# Patient Record
Sex: FEMALE | Race: WHITE | NOT HISPANIC OR LATINO | Employment: OTHER | URBAN - METROPOLITAN AREA
[De-identification: names, ages, dates, MRNs, and addresses within clinical notes are randomized per-mention and may not be internally consistent; named-entity substitution may affect disease eponyms.]

---

## 2021-09-03 ENCOUNTER — OFFICE VISIT (OUTPATIENT)
Dept: URGENT CARE | Facility: CLINIC | Age: 26
End: 2021-09-03
Payer: MEDICAID

## 2021-09-03 VITALS
TEMPERATURE: 98 F | OXYGEN SATURATION: 98 % | DIASTOLIC BLOOD PRESSURE: 76 MMHG | RESPIRATION RATE: 18 BRPM | SYSTOLIC BLOOD PRESSURE: 118 MMHG | WEIGHT: 140 LBS | HEART RATE: 65 BPM

## 2021-09-03 DIAGNOSIS — B34.9 VIRAL SYNDROME: ICD-10-CM

## 2021-09-03 DIAGNOSIS — R09.81 SINUS CONGESTION: Primary | ICD-10-CM

## 2021-09-03 LAB
CTP QC/QA: YES
SARS-COV-2 AG RESP QL IA.RAPID: NEGATIVE

## 2021-09-03 PROCEDURE — 87426 SARS CORONAVIRUS 2 ANTIGEN POCT: ICD-10-PCS | Mod: QW,S$GLB,, | Performed by: NURSE PRACTITIONER

## 2021-09-03 PROCEDURE — 96372 PR INJECTION,THERAP/PROPH/DIAG2ST, IM OR SUBCUT: ICD-10-PCS | Mod: S$GLB,,, | Performed by: NURSE PRACTITIONER

## 2021-09-03 PROCEDURE — 99213 OFFICE O/P EST LOW 20 MIN: CPT | Mod: 25,S$GLB,, | Performed by: NURSE PRACTITIONER

## 2021-09-03 PROCEDURE — 96372 THER/PROPH/DIAG INJ SC/IM: CPT | Mod: S$GLB,,, | Performed by: NURSE PRACTITIONER

## 2021-09-03 PROCEDURE — 87426 SARSCOV CORONAVIRUS AG IA: CPT | Mod: QW,S$GLB,, | Performed by: NURSE PRACTITIONER

## 2021-09-03 PROCEDURE — 99213 PR OFFICE/OUTPT VISIT, EST, LEVL III, 20-29 MIN: ICD-10-PCS | Mod: 25,S$GLB,, | Performed by: NURSE PRACTITIONER

## 2021-09-03 RX ORDER — GUAIFENESIN/DEXTROMETHORPHAN 100-10MG/5
5 SYRUP ORAL EVERY 6 HOURS PRN
Qty: 5 ML | Refills: 0 | Status: SHIPPED | OUTPATIENT
Start: 2021-09-03 | End: 2021-09-13

## 2021-09-03 RX ORDER — AZITHROMYCIN 250 MG/1
TABLET, FILM COATED ORAL
Qty: 6 TABLET | Refills: 0 | Status: ON HOLD | OUTPATIENT
Start: 2021-09-03 | End: 2022-03-27 | Stop reason: HOSPADM

## 2021-09-03 RX ORDER — DEXAMETHASONE SODIUM PHOSPHATE 4 MG/ML
4 INJECTION, SOLUTION INTRA-ARTICULAR; INTRALESIONAL; INTRAMUSCULAR; INTRAVENOUS; SOFT TISSUE
Status: COMPLETED | OUTPATIENT
Start: 2021-09-03 | End: 2021-09-03

## 2021-09-03 RX ORDER — PREDNISONE 20 MG/1
20 TABLET ORAL DAILY
Qty: 5 TABLET | Refills: 0 | Status: SHIPPED | OUTPATIENT
Start: 2021-09-03 | End: 2021-09-08

## 2021-09-03 RX ADMIN — DEXAMETHASONE SODIUM PHOSPHATE 4 MG: 4 INJECTION, SOLUTION INTRA-ARTICULAR; INTRALESIONAL; INTRAMUSCULAR; INTRAVENOUS; SOFT TISSUE at 09:09

## 2022-03-25 PROCEDURE — 99285 EMERGENCY DEPT VISIT HI MDM: CPT | Mod: 25

## 2022-03-25 PROCEDURE — 96374 THER/PROPH/DIAG INJ IV PUSH: CPT

## 2022-03-26 ENCOUNTER — HOSPITAL ENCOUNTER (OUTPATIENT)
Facility: HOSPITAL | Age: 27
Discharge: HOME | End: 2022-03-27
Attending: EMERGENCY MEDICINE | Admitting: STUDENT IN AN ORGANIZED HEALTH CARE EDUCATION/TRAINING PROGRAM
Payer: MEDICAID

## 2022-03-26 ENCOUNTER — HOSPITAL ENCOUNTER (EMERGENCY)
Facility: HOSPITAL | Age: 27
Discharge: ANOTHER HOSPITAL | End: 2022-03-26
Attending: EMERGENCY MEDICINE
Payer: MEDICAID

## 2022-03-26 VITALS
HEIGHT: 68 IN | HEART RATE: 95 BPM | SYSTOLIC BLOOD PRESSURE: 125 MMHG | RESPIRATION RATE: 16 BRPM | WEIGHT: 130 LBS | BODY MASS INDEX: 19.7 KG/M2 | TEMPERATURE: 98 F | OXYGEN SATURATION: 97 % | DIASTOLIC BLOOD PRESSURE: 74 MMHG

## 2022-03-26 DIAGNOSIS — K66.1 HEMOPERITONEUM, NONTRAUMATIC: ICD-10-CM

## 2022-03-26 DIAGNOSIS — K66.1 HEMOPERITONEUM: ICD-10-CM

## 2022-03-26 DIAGNOSIS — N83.209 RUPTURED OVARIAN CYST: Primary | ICD-10-CM

## 2022-03-26 LAB
ABO + RH BLD: NORMAL
ALBUMIN SERPL BCP-MCNC: 4.1 G/DL (ref 3.5–5.2)
ALBUMIN SERPL BCP-MCNC: 4.1 G/DL (ref 3.5–5.2)
ALP SERPL-CCNC: 37 U/L (ref 55–135)
ALP SERPL-CCNC: 45 U/L (ref 55–135)
ALT SERPL W/O P-5'-P-CCNC: 11 U/L (ref 10–44)
ALT SERPL W/O P-5'-P-CCNC: 14 U/L (ref 10–44)
ANION GAP SERPL CALC-SCNC: 10 MMOL/L (ref 8–16)
ANION GAP SERPL CALC-SCNC: 6 MMOL/L (ref 8–16)
AST SERPL-CCNC: 14 U/L (ref 10–40)
AST SERPL-CCNC: 15 U/L (ref 10–40)
B-HCG UR QL: NEGATIVE
BACTERIA GENITAL QL WET PREP: ABNORMAL
BASOPHILS # BLD AUTO: 0.01 K/UL (ref 0–0.2)
BASOPHILS # BLD AUTO: 0.01 K/UL (ref 0–0.2)
BASOPHILS # BLD AUTO: 0.02 K/UL (ref 0–0.2)
BASOPHILS NFR BLD: 0.1 % (ref 0–1.9)
BASOPHILS NFR BLD: 0.2 % (ref 0–1.9)
BASOPHILS NFR BLD: 0.3 % (ref 0–1.9)
BILIRUB SERPL-MCNC: 0.5 MG/DL (ref 0.1–1)
BILIRUB SERPL-MCNC: 0.8 MG/DL (ref 0.1–1)
BILIRUB UR QL STRIP: NEGATIVE
BLD GP AB SCN CELLS X3 SERPL QL: NORMAL
BUN SERPL-MCNC: 13 MG/DL (ref 6–20)
BUN SERPL-MCNC: 15 MG/DL (ref 6–20)
CALCIUM SERPL-MCNC: 8.9 MG/DL (ref 8.7–10.5)
CALCIUM SERPL-MCNC: 9.3 MG/DL (ref 8.7–10.5)
CHLORIDE SERPL-SCNC: 105 MMOL/L (ref 95–110)
CHLORIDE SERPL-SCNC: 106 MMOL/L (ref 95–110)
CLARITY UR: CLEAR
CLUE CELLS VAG QL WET PREP: ABNORMAL
CO2 SERPL-SCNC: 24 MMOL/L (ref 23–29)
CO2 SERPL-SCNC: 25 MMOL/L (ref 23–29)
COLOR UR: YELLOW
CREAT SERPL-MCNC: 0.5 MG/DL (ref 0.5–1.4)
CREAT SERPL-MCNC: 0.8 MG/DL (ref 0.5–1.4)
DIFFERENTIAL METHOD: ABNORMAL
EOSINOPHIL # BLD AUTO: 0.1 K/UL (ref 0–0.5)
EOSINOPHIL NFR BLD: 1.5 % (ref 0–8)
EOSINOPHIL NFR BLD: 1.6 % (ref 0–8)
EOSINOPHIL NFR BLD: 1.7 % (ref 0–8)
ERYTHROCYTE [DISTWIDTH] IN BLOOD BY AUTOMATED COUNT: 12.6 % (ref 11.5–14.5)
ERYTHROCYTE [DISTWIDTH] IN BLOOD BY AUTOMATED COUNT: 12.7 % (ref 11.5–14.5)
ERYTHROCYTE [DISTWIDTH] IN BLOOD BY AUTOMATED COUNT: 12.8 % (ref 11.5–14.5)
EST. GFR  (AFRICAN AMERICAN): >60 ML/MIN/1.73 M^2
EST. GFR  (AFRICAN AMERICAN): >60 ML/MIN/1.73 M^2
EST. GFR  (NON AFRICAN AMERICAN): >60 ML/MIN/1.73 M^2
EST. GFR  (NON AFRICAN AMERICAN): >60 ML/MIN/1.73 M^2
FILAMENT FUNGI VAG WET PREP-#/AREA: ABNORMAL
GLUCOSE SERPL-MCNC: 91 MG/DL (ref 70–110)
GLUCOSE SERPL-MCNC: 96 MG/DL (ref 70–110)
GLUCOSE UR QL STRIP: NEGATIVE
HCG INTACT+B SERPL-ACNC: <1.2 MIU/ML
HCT VFR BLD AUTO: 31.8 % (ref 37–48.5)
HCT VFR BLD AUTO: 32.7 % (ref 37–48.5)
HCT VFR BLD AUTO: 33.1 % (ref 37–48.5)
HCT VFR BLD AUTO: 33.7 % (ref 37–48.5)
HCT VFR BLD AUTO: 34.3 % (ref 37–48.5)
HGB BLD-MCNC: 10.6 G/DL (ref 12–16)
HGB BLD-MCNC: 11 G/DL (ref 12–16)
HGB BLD-MCNC: 11.1 G/DL (ref 12–16)
HGB BLD-MCNC: 11.2 G/DL (ref 12–16)
HGB BLD-MCNC: 11.4 G/DL (ref 12–16)
HGB UR QL STRIP: NEGATIVE
IMM GRANULOCYTES # BLD AUTO: 0.01 K/UL (ref 0–0.04)
IMM GRANULOCYTES # BLD AUTO: 0.01 K/UL (ref 0–0.04)
IMM GRANULOCYTES # BLD AUTO: 0.02 K/UL (ref 0–0.04)
IMM GRANULOCYTES NFR BLD AUTO: 0.2 % (ref 0–0.5)
IMM GRANULOCYTES NFR BLD AUTO: 0.2 % (ref 0–0.5)
IMM GRANULOCYTES NFR BLD AUTO: 0.3 % (ref 0–0.5)
KETONES UR QL STRIP: ABNORMAL
LEUKOCYTE ESTERASE UR QL STRIP: NEGATIVE
LYMPHOCYTES # BLD AUTO: 2.2 K/UL (ref 1–4.8)
LYMPHOCYTES # BLD AUTO: 2.3 K/UL (ref 1–4.8)
LYMPHOCYTES # BLD AUTO: 2.7 K/UL (ref 1–4.8)
LYMPHOCYTES NFR BLD: 36.6 % (ref 18–48)
LYMPHOCYTES NFR BLD: 38.3 % (ref 18–48)
LYMPHOCYTES NFR BLD: 39.1 % (ref 18–48)
MCH RBC QN AUTO: 30.8 PG (ref 27–31)
MCH RBC QN AUTO: 31.3 PG (ref 27–31)
MCH RBC QN AUTO: 31.3 PG (ref 27–31)
MCH RBC QN AUTO: 31.4 PG (ref 27–31)
MCH RBC QN AUTO: 31.6 PG (ref 27–31)
MCHC RBC AUTO-ENTMCNC: 32.9 G/DL (ref 32–36)
MCHC RBC AUTO-ENTMCNC: 33.2 G/DL (ref 32–36)
MCHC RBC AUTO-ENTMCNC: 33.2 G/DL (ref 32–36)
MCHC RBC AUTO-ENTMCNC: 33.3 G/DL (ref 32–36)
MCHC RBC AUTO-ENTMCNC: 34.3 G/DL (ref 32–36)
MCV RBC AUTO: 91 FL (ref 82–98)
MCV RBC AUTO: 93 FL (ref 82–98)
MCV RBC AUTO: 94 FL (ref 82–98)
MCV RBC AUTO: 95 FL (ref 82–98)
MCV RBC AUTO: 95 FL (ref 82–98)
MONOCYTES # BLD AUTO: 0.6 K/UL (ref 0.3–1)
MONOCYTES # BLD AUTO: 0.7 K/UL (ref 0.3–1)
MONOCYTES # BLD AUTO: 0.7 K/UL (ref 0.3–1)
MONOCYTES NFR BLD: 10.3 % (ref 4–15)
MONOCYTES NFR BLD: 10.7 % (ref 4–15)
MONOCYTES NFR BLD: 9.9 % (ref 4–15)
NEUTROPHILS # BLD AUTO: 3 K/UL (ref 1.8–7.7)
NEUTROPHILS # BLD AUTO: 3.1 K/UL (ref 1.8–7.7)
NEUTROPHILS # BLD AUTO: 3.4 K/UL (ref 1.8–7.7)
NEUTROPHILS NFR BLD: 48.5 % (ref 38–73)
NEUTROPHILS NFR BLD: 49 % (ref 38–73)
NEUTROPHILS NFR BLD: 51.5 % (ref 38–73)
NITRITE UR QL STRIP: NEGATIVE
NRBC BLD-RTO: 0 /100 WBC
PH UR STRIP: 6 [PH] (ref 5–8)
PLATELET # BLD AUTO: 150 K/UL (ref 150–450)
PLATELET # BLD AUTO: 154 K/UL (ref 150–450)
PLATELET # BLD AUTO: 168 K/UL (ref 150–450)
PLATELET # BLD AUTO: 178 K/UL (ref 150–450)
PLATELET # BLD AUTO: 182 K/UL (ref 150–450)
PMV BLD AUTO: 9.2 FL (ref 9.2–12.9)
PMV BLD AUTO: 9.4 FL (ref 9.2–12.9)
PMV BLD AUTO: 9.5 FL (ref 9.2–12.9)
PMV BLD AUTO: 9.5 FL (ref 9.2–12.9)
PMV BLD AUTO: 9.6 FL (ref 9.2–12.9)
POTASSIUM SERPL-SCNC: 3.4 MMOL/L (ref 3.5–5.1)
POTASSIUM SERPL-SCNC: 3.4 MMOL/L (ref 3.5–5.1)
PROT SERPL-MCNC: 6.5 G/DL (ref 6–8.4)
PROT SERPL-MCNC: 6.5 G/DL (ref 6–8.4)
PROT UR QL STRIP: NEGATIVE
RBC # BLD AUTO: 3.38 M/UL (ref 4–5.4)
RBC # BLD AUTO: 3.55 M/UL (ref 4–5.4)
RBC # BLD AUTO: 3.57 M/UL (ref 4–5.4)
RBC # BLD AUTO: 3.58 M/UL (ref 4–5.4)
RBC # BLD AUTO: 3.61 M/UL (ref 4–5.4)
SARS-COV-2 RDRP RESP QL NAA+PROBE: NEGATIVE
SODIUM SERPL-SCNC: 136 MMOL/L (ref 136–145)
SODIUM SERPL-SCNC: 140 MMOL/L (ref 136–145)
SP GR UR STRIP: >=1.03 (ref 1–1.03)
SPECIMEN SOURCE: ABNORMAL
T VAGINALIS GENITAL QL WET PREP: ABNORMAL
URN SPEC COLLECT METH UR: ABNORMAL
UROBILINOGEN UR STRIP-ACNC: NEGATIVE EU/DL
WBC # BLD AUTO: 5.23 K/UL (ref 3.9–12.7)
WBC # BLD AUTO: 6.06 K/UL (ref 3.9–12.7)
WBC # BLD AUTO: 6.09 K/UL (ref 3.9–12.7)
WBC # BLD AUTO: 6.6 K/UL (ref 3.9–12.7)
WBC # BLD AUTO: 6.98 K/UL (ref 3.9–12.7)
WBC #/AREA VAG WET PREP: ABNORMAL
YEAST GENITAL QL WET PREP: ABNORMAL

## 2022-03-26 PROCEDURE — 87210 SMEAR WET MOUNT SALINE/INK: CPT | Performed by: EMERGENCY MEDICINE

## 2022-03-26 PROCEDURE — G0378 HOSPITAL OBSERVATION PER HR: HCPCS

## 2022-03-26 PROCEDURE — 81003 URINALYSIS AUTO W/O SCOPE: CPT | Performed by: EMERGENCY MEDICINE

## 2022-03-26 PROCEDURE — 36415 COLL VENOUS BLD VENIPUNCTURE: CPT | Performed by: EMERGENCY MEDICINE

## 2022-03-26 PROCEDURE — 99285 EMERGENCY DEPT VISIT HI MDM: CPT | Mod: 25

## 2022-03-26 PROCEDURE — 25000003 PHARM REV CODE 250: Performed by: EMERGENCY MEDICINE

## 2022-03-26 PROCEDURE — 81025 URINE PREGNANCY TEST: CPT | Performed by: EMERGENCY MEDICINE

## 2022-03-26 PROCEDURE — 96366 THER/PROPH/DIAG IV INF ADDON: CPT

## 2022-03-26 PROCEDURE — 96375 TX/PRO/DX INJ NEW DRUG ADDON: CPT

## 2022-03-26 PROCEDURE — 87491 CHLMYD TRACH DNA AMP PROBE: CPT | Performed by: EMERGENCY MEDICINE

## 2022-03-26 PROCEDURE — 63600175 PHARM REV CODE 636 W HCPCS: Performed by: EMERGENCY MEDICINE

## 2022-03-26 PROCEDURE — 80053 COMPREHEN METABOLIC PANEL: CPT | Performed by: EMERGENCY MEDICINE

## 2022-03-26 PROCEDURE — 80053 COMPREHEN METABOLIC PANEL: CPT | Mod: 91 | Performed by: EMERGENCY MEDICINE

## 2022-03-26 PROCEDURE — 85025 COMPLETE CBC W/AUTO DIFF WBC: CPT | Mod: 91 | Performed by: EMERGENCY MEDICINE

## 2022-03-26 PROCEDURE — 25500020 PHARM REV CODE 255: Performed by: EMERGENCY MEDICINE

## 2022-03-26 PROCEDURE — U0002 COVID-19 LAB TEST NON-CDC: HCPCS | Performed by: EMERGENCY MEDICINE

## 2022-03-26 PROCEDURE — 85027 COMPLETE CBC AUTOMATED: CPT | Performed by: STUDENT IN AN ORGANIZED HEALTH CARE EDUCATION/TRAINING PROGRAM

## 2022-03-26 PROCEDURE — 86901 BLOOD TYPING SEROLOGIC RH(D): CPT | Performed by: EMERGENCY MEDICINE

## 2022-03-26 PROCEDURE — 85025 COMPLETE CBC W/AUTO DIFF WBC: CPT | Performed by: EMERGENCY MEDICINE

## 2022-03-26 PROCEDURE — 36415 COLL VENOUS BLD VENIPUNCTURE: CPT | Performed by: STUDENT IN AN ORGANIZED HEALTH CARE EDUCATION/TRAINING PROGRAM

## 2022-03-26 PROCEDURE — 25000003 PHARM REV CODE 250: Performed by: STUDENT IN AN ORGANIZED HEALTH CARE EDUCATION/TRAINING PROGRAM

## 2022-03-26 PROCEDURE — 84702 CHORIONIC GONADOTROPIN TEST: CPT | Performed by: EMERGENCY MEDICINE

## 2022-03-26 PROCEDURE — 96365 THER/PROPH/DIAG IV INF INIT: CPT

## 2022-03-26 PROCEDURE — 87591 N.GONORRHOEAE DNA AMP PROB: CPT | Performed by: EMERGENCY MEDICINE

## 2022-03-26 RX ORDER — MORPHINE SULFATE 2 MG/ML
6 INJECTION, SOLUTION INTRAMUSCULAR; INTRAVENOUS
Status: COMPLETED | OUTPATIENT
Start: 2022-03-26 | End: 2022-03-26

## 2022-03-26 RX ORDER — DEXTROSE MONOHYDRATE, SODIUM CHLORIDE, AND POTASSIUM CHLORIDE 50; 1.49; 9 G/1000ML; G/1000ML; G/1000ML
INJECTION, SOLUTION INTRAVENOUS CONTINUOUS
Status: DISCONTINUED | OUTPATIENT
Start: 2022-03-26 | End: 2022-03-26

## 2022-03-26 RX ORDER — OXYCODONE HYDROCHLORIDE 5 MG/1
5 TABLET ORAL EVERY 4 HOURS PRN
Status: DISCONTINUED | OUTPATIENT
Start: 2022-03-26 | End: 2022-03-27 | Stop reason: HOSPADM

## 2022-03-26 RX ORDER — MORPHINE SULFATE 4 MG/ML
4 INJECTION, SOLUTION INTRAMUSCULAR; INTRAVENOUS EVERY 4 HOURS PRN
Status: CANCELLED | OUTPATIENT
Start: 2022-03-26

## 2022-03-26 RX ORDER — OXYCODONE HYDROCHLORIDE 5 MG/1
10 TABLET ORAL EVERY 4 HOURS PRN
Status: DISCONTINUED | OUTPATIENT
Start: 2022-03-26 | End: 2022-03-27 | Stop reason: HOSPADM

## 2022-03-26 RX ORDER — HYDROMORPHONE HYDROCHLORIDE 1 MG/ML
1 INJECTION, SOLUTION INTRAMUSCULAR; INTRAVENOUS; SUBCUTANEOUS
Status: COMPLETED | OUTPATIENT
Start: 2022-03-26 | End: 2022-03-26

## 2022-03-26 RX ORDER — ACETAMINOPHEN 500 MG
1000 TABLET ORAL EVERY 8 HOURS PRN
Status: DISCONTINUED | OUTPATIENT
Start: 2022-03-26 | End: 2022-03-27 | Stop reason: HOSPADM

## 2022-03-26 RX ORDER — ONDANSETRON 2 MG/ML
4 INJECTION INTRAMUSCULAR; INTRAVENOUS
Status: COMPLETED | OUTPATIENT
Start: 2022-03-26 | End: 2022-03-26

## 2022-03-26 RX ORDER — ONDANSETRON 2 MG/ML
4 INJECTION INTRAMUSCULAR; INTRAVENOUS EVERY 6 HOURS PRN
Status: CANCELLED | OUTPATIENT
Start: 2022-03-26

## 2022-03-26 RX ORDER — SODIUM CHLORIDE 0.9 % (FLUSH) 0.9 %
3 SYRINGE (ML) INJECTION EVERY 8 HOURS
Status: CANCELLED | OUTPATIENT
Start: 2022-03-26

## 2022-03-26 RX ORDER — POTASSIUM CHLORIDE 7.45 MG/ML
10 INJECTION INTRAVENOUS ONCE
Status: COMPLETED | OUTPATIENT
Start: 2022-03-26 | End: 2022-03-26

## 2022-03-26 RX ADMIN — HYDROMORPHONE HYDROCHLORIDE 1 MG: 1 INJECTION, SOLUTION INTRAMUSCULAR; INTRAVENOUS; SUBCUTANEOUS at 11:03

## 2022-03-26 RX ADMIN — MORPHINE SULFATE 6 MG: 2 INJECTION, SOLUTION INTRAMUSCULAR; INTRAVENOUS at 07:03

## 2022-03-26 RX ADMIN — OXYCODONE HYDROCHLORIDE 5 MG: 5 TABLET ORAL at 06:03

## 2022-03-26 RX ADMIN — IOHEXOL 75 ML: 350 INJECTION, SOLUTION INTRAVENOUS at 04:03

## 2022-03-26 RX ADMIN — ONDANSETRON 4 MG: 2 INJECTION INTRAMUSCULAR; INTRAVENOUS at 11:03

## 2022-03-26 RX ADMIN — POTASSIUM CHLORIDE 130 ML/HR: 149 INJECTION, SOLUTION, CONCENTRATE INTRAVENOUS at 01:03

## 2022-03-26 RX ADMIN — POTASSIUM CHLORIDE 10 MEQ: 7.46 INJECTION, SOLUTION INTRAVENOUS at 01:03

## 2022-03-26 RX ADMIN — IBUPROFEN 600 MG: 200 TABLET, FILM COATED ORAL at 06:03

## 2022-03-26 RX ADMIN — OXYCODONE HYDROCHLORIDE 5 MG: 5 TABLET ORAL at 10:03

## 2022-03-26 RX ADMIN — OXYCODONE HYDROCHLORIDE 5 MG: 5 TABLET ORAL at 11:03

## 2022-03-26 NOTE — H&P
[facility] - Emergency Dept  Obstetrics & Gynecology  History & Physical    Patient Name: Nola Reyna  MRN: [id number]  Admission Date: 3/26/2022  Primary Care Provider: Primary Doctor No    Subjective:     Chief Complaint/Reason for Admission:  Abdominal pain and hemoperitoneum, possible ruptured ovarian cyst    History of Present Illness:  27-year-old  presented to [facility] ED with abdominal pain after vaginal intercourse that has lasted last 2 days.  She was initially tachycardic on presentation which is now resolved. She denied any vaginal bleeding, discharge, nausea, vomiting, syncope, lightheadedness, dizziness, or any other symptoms.  Her pregnancy test was negative, however on imaging she was noted to have a complex ovarian cyst with no evidence of torsion but possibly ruptured with apparent hemoperitoneum seen on imaging.  Her H/H was stable prior to transfer for observation here at [facility].  She has not required any IV pain medications since 11:00 a.m..  Declined pain medication the last time it was offered.  Still without any of the above symptoms besides abdominal pain. She does report however that she feels much better than when she initially presented to ED.     Current Facility-Administered Medications on File Prior to Encounter   Medication    [COMPLETED] iohexoL (OMNIPAQUE 350) injection 75 mL    [COMPLETED] morphine injection 6 mg     Current Outpatient Medications on File Prior to Encounter   Medication Sig    azithromycin (Z-NAMITA) 250 MG tablet Take 2 tablets by mouth on day 1; Take 1 tablet by mouth on days 2-5       Review of patient's allergies indicates:  No Known Allergies    No past medical history on file.  OB History   No obstetric history on file.     No past surgical history on file.  Family History    None       Tobacco Use    Smoking status: Never Smoker    Smokeless tobacco: Never Used   Substance and Sexual Activity    Alcohol use: Not on file     Drug use: Not on file    Sexual activity: Not on file     Review of Systems negative except above  Objective:     Vital Signs (Most Recent):  Temp: 98.5 °F (36.9 °C) (03/26/22 1025)  Pulse: (!) 44 (03/26/22 1557)  Resp: (!) 24 (03/26/22 1107)  BP: 114/70 (03/26/22 1431)  SpO2: 95 % (03/26/22 1557) Vital Signs (24h Range):  Temp:  [98.1 °F (36.7 °C)-98.5 °F (36.9 °C)] 98.5 °F (36.9 °C)  Pulse:  [] 44  Resp:  [16-24] 24  SpO2:  [94 %-100 %] 95 %  BP: (100-128)/(55-74) 114/70     Weight: 59 kg (130 lb)  Body mass index is 19.77 kg/m².  Patient's last menstrual period was 03/05/2022 (approximate).    Physical Exam  NAD, AAO  Regular rhythm, bradycardic (pt reports this is her baseline)  Nonlabored respirations  Abdomen soft, nondistended, diffusely tender, no guarding, positive rebound consistent with hemoperitoneum  :  Deferred  Extremities:  No edema, no calf tenderness, no evidence of DVT      Laboratory:  Recent Lab Results  (Last 5 results in the past 24 hours)      03/26/22  1540   03/26/22  1103   03/26/22  0737   03/26/22  0736   03/26/22  0724        Albumin   4.1             Alkaline Phosphatase   37             ALT   14             Anion Gap   6             AST   15             Baso #   0.02   0.01           Basophil %   0.3   0.2           BILIRUBIN TOTAL   0.8  Comment: For infants and newborns, interpretation of results should be based  on gestational age, weight and in agreement with clinical  observations.    Premature Infant recommended reference ranges:  Up to 24 hours.............<8.0 mg/dL  Up to 48 hours............<12.0 mg/dL  3-5 days..................<15.0 mg/dL  6-29 days.................<15.0 mg/dL               BUN   13             Calcium   8.9             Chloride   105             CO2   25             Creatinine   0.5             Differential Method   Automated   Automated           eGFR if    >60.0             eGFR if non    >60.0  Comment:  Calculation used to obtain the estimated glomerular filtration  rate (eGFR) is the CKD-EPI equation.                Eos #   0.1   0.1           Eosinophil %   1.5   1.6           Glucose   91             Gran # (ANC)   3.1   3.0           Gran %   51.5   49.0           Group & Rh   O POS             HCG Quant         <1.2  Comment: Quantitative HCG Reference Ranges:  Males........................<5.0 mIU/mL  Non-Pregnant Females.........<5.0 mIU/mL  Pregnancy:  Weeks post-LMP...............Range (mIU/mL)  1-10  weeks.....................202-231,000  11-15 weeks..................22,536-234,990  16-22 weeks...................8,007-50,064  23-40 weeks...................1,600-49,413    NOTE:  This assay is not FDA approved for tumor screening,   diagnosis, or monitoring.         Hematocrit 33.1   32.7   33.7           Hemoglobin 11.0   11.2   11.1           Immature Grans (Abs)   0.01  Comment: Mild elevation in immature granulocytes is non specific and   can be seen in a variety of conditions including stress response,   acute inflammation, trauma and pregnancy. Correlation with other   laboratory and clinical findings is essential.     0.01  Comment: Mild elevation in immature granulocytes is non specific and   can be seen in a variety of conditions including stress response,   acute inflammation, trauma and pregnancy. Correlation with other   laboratory and clinical findings is essential.             Immature Granulocytes   0.2   0.2           INDIRECT MT   NEG             Lymph #   2.2   2.3           Lymph %   36.6   38.3           MCH 30.8   31.3   31.3           MCHC 33.2   34.3   32.9           MCV 93   91   95           Mono #   0.6   0.7           Mono %   9.9   10.7           MPV 9.6   9.5   9.5           nRBC   0   0           Platelets 154   178   168           Potassium   3.4             PROTEIN TOTAL   6.5             RBC 3.57   3.58   3.55           RDW 12.7   12.7   12.6           SARS-CoV-2 RNA,  Amplification, Qual       Negative  Comment: This test utilizes isothermal nucleic acid amplification   technology to detect the SARS-CoV-2 RdRp nucleic acid segment.   The analytical sensitivity (limit of detection) is 125 genome   equivalents/mL.     A POSITIVE result implies infection with the SARS-CoV-2 virus;  the patient is presumed to be contagious.    A NEGATIVE result means that SARS-CoV-2 nucleic acids are not  present above the limit of detection. A NEGATIVE result should be   treated as presumptive. It does not rule out the possibility of   COVID-19 and should not be the sole basis for treatment decisions.   If COVID-19 is strongly suspected based on clinical and exposure   history, re-testing using an alternate molecular assay should be   considered.       This test is only for use under the Food and Drug   Administration s Emergency Use Authorization (EUA).   Commercial kits are provided by [employer].   Performance characteristics of the EUA have been independently  verified by [facility] Department of  Pathology and Laboratory Medicine.   _________________________________________________________________  The ID NOW COVID-19 Letter of Authorization, along with the   authorized Fact Sheet for Healthcare Providers, the authorized Fact  Sheet for Patients, and authorized labeling are available on the FDA   website:  www.fda.gov/MedicalDevices/Safety/EmergencySituations/rkj953125.htm           Sodium   136             WBC 5.23   6.06   6.09                              I have personallly reviewed all pertinent lab results from the last 24 hours.    Diagnostic Results:  EXAMINATION:  CT ABDOMEN PELVIS WITH CONTRAST     CLINICAL HISTORY:  Abdominal trauma, blunt;Abdominal trauma, blunt, urologic injury suspected;     TECHNIQUE:  Low dose axial images, sagittal and coronal reformations were obtained from the lung bases to the pubic symphysis following the IV administration of 75 mL of  Omnipaque 350 .  Oral contrast was not given.     COMPARISON:  None.     FINDINGS:  Lung bases are clear.  Normal size heart.  Nondistended gallbladder.  Mild intrahepatic biliary dilation with normal caliber common duct.     Riedel lobe configuration of the liver.  3 mm right renal stone.  Subcentimeter linear lucency along the dorsal margin of the spleen as can be seen coronal series 601, image 86.  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  Remaining solid abdominal organs are unremarkable.     There is no enteric contrast which limits bowel assessment.  Under distension likely accounts for gastric wall thickening.  No dilated bowel loops.     There is ascites which is water density in the abdomen, and in the pelvis there is layering high density in the fluid.  Along the right adnexa there is a 2.3 cm fluid density lesion with a peripheral rim of enhancement, presumably an ovarian cyst.  Urinary bladder is decompressed.  Unremarkable uterus.  No pneumoperitoneum.  Umbilical piercing.     No acute osseous abnormality.     Impression:     1. Moderate volume ascites with a component of hemorrhage.  Overall etiology is uncertain.  2. Nonobstructing right nephrolith.       EXAMINATION:  US PELVIS COMP WITH TRANSVAG NON-OB (XPD)     CLINICAL HISTORY:  hemoperitoneum; Hemoperitoneum     TECHNIQUE:  Transabdominal sonography of the pelvis was performed, followed by transvaginal sonography to better evaluate the uterus and ovaries.     COMPARISON:  CT abdomen and pelvis March 26, 2022     FINDINGS:  Uterus:     Size: 8.8 x 5.1 x 5.7 cm     Masses: None.  There are few small nabothian cysts within the cervix.     Endometrium: Abnormally thickened in this pre menopausal patient, measuring 19 mm.  No discrete endometrial lesion identified.     Right ovary:     Size: 4.7 x 3.5 x 2.8 cm     Appearance: There is a 2.8 x 2.4 x 2.8 cm complex cyst arising from the right ovary which demonstrates peripheral vascularity  suggestive of a corpus luteum cyst.  There is an additional nonspecific complex cyst arising of the right ovary measuring 1.5 x 1.6 x 1.4 cm without associated vascularity.     Vascular flow: Normal.     Left ovary:     Size: 3.8 x 2.6 x 2.5 cm     Appearance: Normal     Vascular Flow: Normal.     Free Fluid:     Large volume complex fluid seen within the posterior cul-de-sac and surrounding the uterus consistent with the hemoperitoneum seen on recent CT scan.     Impression:     1.  2.8 cm complex right ovarian cyst corresponding to the peripherally enhancing right ovarian lesion seen on recent CT exam most consistent with a corpus luteum cyst.  Another nonspecific 1.6 cm complex right ovarian cyst also present.  Follow-up pelvic sonogram in 6-12 weeks could be performed to assess for resolution of these findings.     2.  Unremarkable left ovary.     3.  Thickened endometrium measuring 19 mm.  No discrete endometrial lesion.     4.  Complex pelvic fluid corresponding to the hemoperitoneum seen on recent CT exam.       Assessment/Plan:     -hemoperitoneum with possible ruptured ovarian cyst:  Patient appears stable with no change in her initial H/H at [facility].  No evidence of acute bleeding at this time.  Discussed with patient option for conservative management with serial labs and exams verses surgical intervention with laparoscopic evacuation of hemoperitoneum, possible laparotomy.  Patient reports that because she is feeling better, she desires to continue conservative management at this time.  Strong precautions reviewed with patient and if any evidence of clinical deterioration will proceed to the OR.  -patient declines IV pain medication.  Will offer oral pain medication at this time to evaluate if this is adequate pain control for the patient  -currently NPO in the event that she requires surgical intervention. Since most recent CBC is stable, may have a regular diet  -disposition:  Will continue  serial abdominal exams and labs until patient is clinically improved and stable and meeting all discharge criteria.    Nallely Villanueva MD, MD  Obstetrics & Gynecology  [facility] - Emergency Dept

## 2022-03-26 NOTE — ED PROVIDER NOTES
Encounter Date: 3/26/2022       History   No chief complaint on file.    27-year-old female no significant past medical problems patient transferred from [facility] for evaluation for suspected hemoperitoneum.  Patient presented with 2 day history of increasing abdominal pain which symptoms occurred after vaginal intercourse.  Patient denied vaginal bleeding, no vaginal discharge, no fever, no other constitutional symptoms.  Patient's workup at [facility] reveals CT findings consistent with hemoperitoneum.  Patient did have ultrasound which showed a complex ovarian cyst, no torsion, findings consistent with possible ruptured ovarian cyst.  Patient did receive morphine x2 for pain management.  H&H remained stable at 11 and 35 at time of transfer this morning at 7:30 a.m. Patient's case was discussed with , currently at this time patient will be made NPO and undergo serial H&H monitoring.  Patient also will undergo serial abdominal examination and pain control.        Review of patient's allergies indicates:  No Known Allergies  No past medical history on file.  No past surgical history on file.  No family history on file.  Social History     Tobacco Use    Smoking status: Never Smoker    Smokeless tobacco: Never Used     Review of Systems   Constitutional: Negative for fever.   HENT: Negative for sore throat.    Respiratory: Negative for shortness of breath.    Cardiovascular: Negative for chest pain.   Gastrointestinal: Positive for abdominal pain. Negative for abdominal distention, nausea and vomiting.   Genitourinary: Negative for dysuria.   Musculoskeletal: Negative for arthralgias, back pain and myalgias.   Skin: Negative for rash.   Neurological: Negative for weakness.   Hematological: Does not bruise/bleed easily.       Physical Exam     Initial Vitals [03/26/22 1025]   BP Pulse Resp Temp SpO2   128/60 (!) 55 18 98.5 °F (36.9 °C) 100 %      MAP       --         Physical  Exam    Nursing note and vitals reviewed.  Constitutional: She appears well-developed and well-nourished.   HENT:   Head: Normocephalic and atraumatic.   Nose: Nose normal.   Mouth/Throat: Oropharynx is clear and moist.   Eyes: Conjunctivae and EOM are normal. Pupils are equal, round, and reactive to light.   Neck: Neck supple. No thyromegaly present. No tracheal deviation present.   Normal range of motion.  Cardiovascular: Normal rate, regular rhythm, normal heart sounds and intact distal pulses. Exam reveals no gallop and no friction rub.    No murmur heard.  Pulmonary/Chest: Breath sounds normal. No stridor. No respiratory distress.   Abdominal: Abdomen is soft. Bowel sounds are normal. She exhibits no mass.     There is no rebound and no guarding.   Musculoskeletal:         General: No edema. Normal range of motion.      Cervical back: Normal range of motion and neck supple.     Lymphadenopathy:     She has no cervical adenopathy.   Neurological: She is alert and oriented to person, place, and time. She has normal strength and normal reflexes. GCS score is 15. GCS eye subscore is 4. GCS verbal subscore is 5. GCS motor subscore is 6.   Skin: Skin is warm and dry. Capillary refill takes less than 2 seconds.   Psychiatric: She has a normal mood and affect.         ED Course   Procedures  Labs Reviewed   CBC W/ AUTO DIFFERENTIAL - Abnormal; Notable for the following components:       Result Value    RBC 3.58 (*)     Hemoglobin 11.2 (*)     Hematocrit 32.7 (*)     MCH 31.3 (*)     All other components within normal limits   COMPREHENSIVE METABOLIC PANEL - Abnormal; Notable for the following components:    Potassium 3.4 (*)     Alkaline Phosphatase 37 (*)     Anion Gap 6 (*)     All other components within normal limits   TYPE & SCREEN          Imaging Results    None          Medications   potassium chloride 10 mEq in 100 mL IVPB (has no administration in time range)   dextrose 5 % and 0.9 % NaCl with KCl 20 mEq  infusion (has no administration in time range)   HYDROmorphone injection 1 mg (1 mg Intravenous Given 3/26/22 1107)   ondansetron injection 4 mg (4 mg Intravenous Given 3/26/22 1104)     Medical Decision Making:   Initial Assessment:   27-year-old female no significant past medical problems patient transferred from [facility] for evaluation for suspected hemoperitoneum.  Patient presented with 2 day history of increasing abdominal pain which symptoms occurred after vaginal intercourse.  Patient denied vaginal bleeding, no vaginal discharge, no fever, no other constitutional symptoms.  Patient's workup at [facility] reveals CT findings consistent with hemoperitoneum.  Patient did have ultrasound which showed a complex ovarian cyst, no torsion, findings consistent with possible ruptured ovarian cyst.  Patient did receive morphine x2 for pain management.  H&H remained stable at 11 and 35 at time of transfer this morning at 7:30 a.m. Patient's case was discussed with , currently at this time patient will be made NPO and undergo serial H&H monitoring.  Patient also will undergo serial abdominal examination and pain control.        Differential Diagnosis:   Ruptured ovarian cyst, hemoperitoneum,  Clinical Tests:   Lab Tests: Ordered and Reviewed  Radiological Study: Ordered and Reviewed  ED Management:  Patient case discussed with Dr. Villanueva.  Currently at this time patient will be admitted for serial H&H and serial abdominal exams.  Who will continue being NPO to further notice.  Will be given IV hydration D5 half-normal saline with 20 mEq potassium at 130 cc an hour.  Patient also will be given Zofran and morphine for pain control.  Patient remained hemodynamically adequate emergency department.  Currently awaiting admission.                      Clinical Impression:   Final diagnoses:  [K66.1] Hemoperitoneum, nontraumatic          ED Disposition Condition    Observation                Jong Green MD  03/26/22 5104

## 2022-03-26 NOTE — ED NOTES
"ABDOMINAL PAIN. MASK IN PLACE.  PRIVATE ROOM. EVEN AND NON LABORED RESPIRATIONS.  AIRWAY CLEAR.  PULSES REGULAR.  < 3" CAPILLARY REFILL. SKIN WDI.  MAEW.  NON DISTENDED ABDOMEN. ALERT, ORIENTED AND CALM.  CALL LIGHT IN REACH.  "

## 2022-03-26 NOTE — ED PROVIDER NOTES
"Encounter Date: 3/25/2022    SCRIBE #1 NOTE: I, Dannielle Lira, am scribing for, and in the presence of, Zac Hunt MD.       History     Chief Complaint   Patient presents with    Abdominal Pain     Pt reporting abd pain and urinary retention after having sex last night. Pt states they are able to urinate now, but feel distended      Time seen by provider: 1:00 AM on 03/26/2022    Nola Reyna is a 27 y.o. female who presents to the ED with abdominal distension and abdominal pain that began 2 days ago. Pt reports that after sexual intercourse she had difficulty urinating, abdominal pain, and back pain. Pt reports that she is able to urinate regularly now, but 2 days ago she had the sensation to go and wasn't able too unless she pushed on her abdomen. Pt describes abdominal pain as tension and back pain as pressure. Pt endorses abdominal pain and back pain after intercourse often, but states this episode is more painful. Pt vaginally delivered 3 children. Pt denies taking medication for pain. Pt has Hx of tubal ligation. Pt reports that her partner felt something "pop" in her cervix during intercourse. The patient denies fever, congestion, cough, chest pain, or any other symptoms at this time. No PMHx or PSHx.     The history is provided by the patient.     Review of patient's allergies indicates:  No Known Allergies  No past medical history on file.  No past surgical history on file.  No family history on file.  Social History     Tobacco Use    Smoking status: Never Smoker    Smokeless tobacco: Never Used     Review of Systems   Constitutional: Negative for fever.   HENT: Negative for congestion.    Respiratory: Negative for cough.    Cardiovascular: Negative for chest pain.   Gastrointestinal: Positive for abdominal distention and abdominal pain (resolved).   Genitourinary: Positive for difficulty urinating (resolved).   Musculoskeletal: Positive for back pain (resolved).   Skin: Negative for " pallor.   Hematological: Does not bruise/bleed easily.   Psychiatric/Behavioral: Negative for agitation.       Physical Exam     Initial Vitals [03/25/22 2228]   BP Pulse Resp Temp SpO2   119/66 106 16 98.1 °F (36.7 °C) 99 %      MAP       --         Physical Exam    Nursing note and vitals reviewed.  Constitutional: She appears well-developed and well-nourished.  Non-toxic appearance. No distress.   HENT:   Head: Normocephalic and atraumatic.   Eyes: EOM are normal. Pupils are equal, round, and reactive to light.   Neck: Neck supple. No JVD present.   Normal range of motion.  Cardiovascular: Regular rhythm, normal heart sounds and intact distal pulses. Tachycardia present.  Exam reveals no gallop and no friction rub.    No murmur heard.  Pulmonary/Chest: Breath sounds normal. She has no wheezes. She has no rhonchi. She has no rales.   Abdominal: Abdomen is soft. Bowel sounds are normal. There is abdominal tenderness (minimal suprapubic).   There is right CVA tenderness.There is no rebound and no guarding.   Genitourinary:    Genitourinary Comments: Scant discharge and mucus coming from cervix.  Cervix is friable.  No cervical motion tenderness.     Musculoskeletal:         General: Normal range of motion.      Cervical back: Normal range of motion and neck supple. No rigidity.     Neurological: She is alert and oriented to person, place, and time. She has normal strength and normal reflexes. No cranial nerve deficit or sensory deficit. She exhibits normal muscle tone. Coordination normal. GCS eye subscore is 4. GCS verbal subscore is 5. GCS motor subscore is 6.   Skin: Skin is warm and dry.   Psychiatric: She has a normal mood and affect. Her speech is normal and behavior is normal. She is not actively hallucinating.         ED Course   Procedures  Labs Reviewed   VAGINAL SCREEN - Abnormal; Notable for the following components:       Result Value    Clue Cells Few (*)     WBC - Vaginal Screen Many (*)     Bacteria  - Vaginal Screen Moderate (*)     All other components within normal limits    Narrative:     Release to patient->Immediate   URINALYSIS, REFLEX TO URINE CULTURE - Abnormal; Notable for the following components:    Specific Gravity, UA >=1.030 (*)     Ketones, UA Trace (*)     All other components within normal limits    Narrative:     Specimen Source->Urine   CBC W/ AUTO DIFFERENTIAL - Abnormal; Notable for the following components:    RBC 3.61 (*)     Hemoglobin 11.4 (*)     Hematocrit 34.3 (*)     MCH 31.6 (*)     All other components within normal limits   COMPREHENSIVE METABOLIC PANEL - Abnormal; Notable for the following components:    Potassium 3.4 (*)     Alkaline Phosphatase 45 (*)     All other components within normal limits   CBC W/ AUTO DIFFERENTIAL - Abnormal; Notable for the following components:    RBC 3.55 (*)     Hemoglobin 11.1 (*)     Hematocrit 33.7 (*)     MCH 31.3 (*)     All other components within normal limits    Narrative:     Lab repeat   C. TRACHOMATIS/N. GONORRHOEAE BY AMP DNA   PREGNANCY TEST, URINE RAPID    Narrative:     Specimen Source->Urine   HCG, QUANTITATIVE    Narrative:     Release to patient->Immediate   SARS-COV-2 RNA AMPLIFICATION, QUAL          Imaging Results          US Pelvis Comp with Transvag NON-OB (xpd) (Final result)  Result time 03/26/22 08:53:42   Procedure changed from US Transvaginal Non OB     Final result by Paul Colunga MD (03/26/22 08:53:42)                 Impression:      1.  2.8 cm complex right ovarian cyst corresponding to the peripherally enhancing right ovarian lesion seen on recent CT exam most consistent with a corpus luteum cyst.  Another nonspecific 1.6 cm complex right ovarian cyst also present.  Follow-up pelvic sonogram in 6-12 weeks could be performed to assess for resolution of these findings.    2.  Unremarkable left ovary.    3.  Thickened endometrium measuring 19 mm.  No discrete endometrial lesion.    4.  Complex pelvic fluid  corresponding to the hemoperitoneum seen on recent CT exam.      Electronically signed by: Paul Valdes  Date:    03/26/2022  Time:    08:53             Narrative:    EXAMINATION:  US PELVIS COMP WITH TRANSVAG NON-OB (XPD)    CLINICAL HISTORY:  hemoperitoneum; Hemoperitoneum    TECHNIQUE:  Transabdominal sonography of the pelvis was performed, followed by transvaginal sonography to better evaluate the uterus and ovaries.    COMPARISON:  CT abdomen and pelvis March 26, 2022    FINDINGS:  Uterus:    Size: 8.8 x 5.1 x 5.7 cm    Masses: None.  There are few small nabothian cysts within the cervix.    Endometrium: Abnormally thickened in this pre menopausal patient, measuring 19 mm.  No discrete endometrial lesion identified.    Right ovary:    Size: 4.7 x 3.5 x 2.8 cm    Appearance: There is a 2.8 x 2.4 x 2.8 cm complex cyst arising from the right ovary which demonstrates peripheral vascularity suggestive of a corpus luteum cyst.  There is an additional nonspecific complex cyst arising of the right ovary measuring 1.5 x 1.6 x 1.4 cm without associated vascularity.    Vascular flow: Normal.    Left ovary:    Size: 3.8 x 2.6 x 2.5 cm    Appearance: Normal    Vascular Flow: Normal.    Free Fluid:    Large volume complex fluid seen within the posterior cul-de-sac and surrounding the uterus consistent with the hemoperitoneum seen on recent CT scan.                               CT Abdomen Pelvis With Contrast (Final result)  Result time 03/26/22 07:11:23    Final result by Kade Adkins MD (03/26/22 07:11:23)                 Impression:      1. Moderate volume ascites with a component of hemorrhage.  Overall etiology is uncertain.  2. Nonobstructing right nephrolith.      Electronically signed by: Kade Adkins  Date:    03/26/2022  Time:    07:11             Narrative:    EXAMINATION:  CT ABDOMEN PELVIS WITH CONTRAST    CLINICAL HISTORY:  Abdominal trauma, blunt;Abdominal trauma, blunt, urologic injury  suspected;    TECHNIQUE:  Low dose axial images, sagittal and coronal reformations were obtained from the lung bases to the pubic symphysis following the IV administration of 75 mL of Omnipaque 350 .  Oral contrast was not given.    COMPARISON:  None.    FINDINGS:  Lung bases are clear.  Normal size heart.  Nondistended gallbladder.  Mild intrahepatic biliary dilation with normal caliber common duct.    Riedel lobe configuration of the liver.  3 mm right renal stone.  Subcentimeter linear lucency along the dorsal margin of the spleen as can be seen coronal series 601, image 86.  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  Remaining solid abdominal organs are unremarkable.    There is no enteric contrast which limits bowel assessment.  Under distension likely accounts for gastric wall thickening.  No dilated bowel loops.    There is ascites which is water density in the abdomen, and in the pelvis there is layering high density in the fluid.  Along the right adnexa there is a 2.3 cm fluid density lesion with a peripheral rim of enhancement, presumably an ovarian cyst.  Urinary bladder is decompressed.  Unremarkable uterus.  No pneumoperitoneum.  Umbilical piercing.    No acute osseous abnormality.                                 Medications   iohexoL (OMNIPAQUE 350) injection 75 mL (75 mLs Intravenous Given 3/26/22 0426)   morphine injection 6 mg (6 mg Intravenous Given 3/26/22 0711)     Medical Decision Making:   History:   Old Medical Records: I decided to obtain old medical records.  Initial Assessment:   27-year-old woman presents emergency department for evaluation of difficulty urinating and suprapubic pressure and pain after intercourse approximately 24 hours ago.  Vitals with mild tachycardia,normotension and afebrile. Minimal suprapubic ttp on exam. Pelvic with mild mucus/posss physiological DC and no CMT.UPT negative, doubt ruptured ectopic pregnancy.  Pt began developing worsening pain in ED. CT abd  pelvis ordered. Care turne over to Dr. Real at shift change.  Clinical Tests:   Lab Tests: Ordered and Reviewed          Scribe Attestation:   Scribe #1: I performed the above scribed service and the documentation accurately describes the services I performed. I attest to the accuracy of the note.        ED Course as of 03/26/22 1657   Sat Mar 26, 2022   0710 CT-AP:  1. Large amount of hemoperitoneum especially in the pelvis. This is probably originating from a 2.2  cm right adnexal ruptured/bleeding cyst. Correlate with hCG  2. Posteriorly in the spleen, there is a small linear lucency. Most likely this is a cleft or other anatomic  variation rather than a laceration. Hemorrhage does not appear to be originating from this site.  Correlate with any associated symptoms/recent trauma  3. Incidental findings including intrahepatic biliary dilation as well as nonobstructive right  Nephrolithiasis  (Rad read) [MR]   0730 D/w Dr. Barakat OB/Gyne who rec txf ED to ED.  Discussed hemoperitoneum from bleeding ovarian cyst.  Repeat CBC and TV US ordered per Dr. Barakat.  Also d/w Dr. Gaffney ED prior to EMS txf. [MR]   0734 On my examination the patient has marked lower abdominal tenderness with moderate voluntary guarding with milder upper abdominal tenderness.  There is no distention, masses, or palpable hernias.  She has no involuntary guarding or rebound tenderness. [MR]      ED Course User Index  [MR] Kade Arnold MD          I, Gilbert Rico, personally performed the services described in this documentation. All medical record entries made by the scribe were at my direction and in my presence.  I have reviewed the chart and agree that the record reflects my personal performance and is accurate and complete. Zac Hunt MD.  4:57 PM 03/26/2022       DISCLAIMER: This note was prepared with [employer] voice recognition transcription software. Garbled syntax, mangled pronouns, and other  bizarre constructions may be attributed to that software system.      Clinical Impression:   Final diagnoses:  [K66.1] Hemoperitoneum  [K66.1] Hemoperitoneum - Eval ? R ovarian cyst          ED Disposition Condition    Transfer to Another Facility Stable              Zac Hunt MD  03/26/22 0439

## 2022-03-26 NOTE — ED TRIAGE NOTES
Admit per [facility] to ED 17, 28 yo female transferred from [facility] after evaluation for abdominal pain. CT noted hemoperitoneum. Sent for OB/GYN evaluation.

## 2022-03-27 VITALS
BODY MASS INDEX: 20.03 KG/M2 | HEIGHT: 68 IN | RESPIRATION RATE: 17 BRPM | WEIGHT: 132.19 LBS | TEMPERATURE: 98 F | SYSTOLIC BLOOD PRESSURE: 97 MMHG | OXYGEN SATURATION: 99 % | DIASTOLIC BLOOD PRESSURE: 58 MMHG | HEART RATE: 60 BPM

## 2022-03-27 PROBLEM — N83.209 RUPTURED OVARIAN CYST: Status: ACTIVE | Noted: 2022-03-27

## 2022-03-27 PROBLEM — K66.1 HEMOPERITONEUM: Status: ACTIVE | Noted: 2022-03-27

## 2022-03-27 LAB
C TRACH DNA SPEC QL NAA+PROBE: NOT DETECTED
ERYTHROCYTE [DISTWIDTH] IN BLOOD BY AUTOMATED COUNT: 12.7 % (ref 11.5–14.5)
HCT VFR BLD AUTO: 31.4 % (ref 37–48.5)
HGB BLD-MCNC: 10.3 G/DL (ref 12–16)
MCH RBC QN AUTO: 30.8 PG (ref 27–31)
MCHC RBC AUTO-ENTMCNC: 32.8 G/DL (ref 32–36)
MCV RBC AUTO: 94 FL (ref 82–98)
N GONORRHOEA DNA SPEC QL NAA+PROBE: NOT DETECTED
PLATELET # BLD AUTO: 151 K/UL (ref 150–450)
PMV BLD AUTO: 9.7 FL (ref 9.2–12.9)
RBC # BLD AUTO: 3.34 M/UL (ref 4–5.4)
WBC # BLD AUTO: 4.94 K/UL (ref 3.9–12.7)

## 2022-03-27 PROCEDURE — 85027 COMPLETE CBC AUTOMATED: CPT | Performed by: STUDENT IN AN ORGANIZED HEALTH CARE EDUCATION/TRAINING PROGRAM

## 2022-03-27 PROCEDURE — G0378 HOSPITAL OBSERVATION PER HR: HCPCS

## 2022-03-27 PROCEDURE — 36415 COLL VENOUS BLD VENIPUNCTURE: CPT | Performed by: STUDENT IN AN ORGANIZED HEALTH CARE EDUCATION/TRAINING PROGRAM

## 2022-03-27 PROCEDURE — 25000003 PHARM REV CODE 250: Performed by: STUDENT IN AN ORGANIZED HEALTH CARE EDUCATION/TRAINING PROGRAM

## 2022-03-27 RX ORDER — IBUPROFEN 600 MG/1
600 TABLET ORAL EVERY 6 HOURS PRN
Qty: 60 TABLET | Refills: 0 | Status: SHIPPED | OUTPATIENT
Start: 2022-03-27

## 2022-03-27 RX ORDER — ACETAMINOPHEN 500 MG
1000 TABLET ORAL EVERY 8 HOURS PRN
Qty: 60 TABLET | Refills: 0 | Status: SHIPPED | OUTPATIENT
Start: 2022-03-27

## 2022-03-27 RX ORDER — OXYCODONE HYDROCHLORIDE 5 MG/1
5 TABLET ORAL EVERY 4 HOURS PRN
Qty: 14 TABLET | Refills: 0 | Status: SHIPPED | OUTPATIENT
Start: 2022-03-27

## 2022-03-27 RX ADMIN — SODIUM CHLORIDE 500 ML: 0.9 INJECTION, SOLUTION INTRAVENOUS at 11:03

## 2022-03-27 NOTE — DISCHARGE SUMMARY
[facility]  Obstetrics & Gynecology  Discharge Summary    Patient Name: Nola Reyna  MRN: [id number]  Admission Date: 3/26/2022  Hospital Length of Stay: 0 days  Discharge Date and Time:  2022 10:54 AM  Attending Physician: Nallely Villanueva, *   Discharging Provider: Nallely Villanueva MD, MD  Primary Care Provider: Primary Doctor No    Hospital Course:  27-year-old  presented to [facility] ED with abdominal pain after vaginal intercourse that has lasted 2 days.  She was initially tachycardic on presentation which resolved. She denied any vaginal bleeding, discharge, nausea, vomiting, syncope, lightheadedness, dizziness, or any other symptoms.  Her pregnancy test was negative, however on imaging she was noted to have a complex ovarian cyst with no evidence of torsion but possibly ruptured with apparent hemoperitoneum seen on imaging.  Her H/H was stable prior to transfer for observation here at [facility].  Throughout her stay here her H/H remained stable, and she continued to clinically improve.  Vital signs remained stable, her pain continued to improve with oral pain medication.  She is ambulating and voiding without difficulty.  Tolerating regular diet.  Denies vaginal bleeding.  Improvement in abdominal exam as she is now less diffusely tender and more focally tender in the periumbilical and lower abdomen region.  Still with some rebound consistent with hemoperitoneum, but no guarding.  No worsening acute findings on exam.  Very strong precautions reviewed with patient and again discussed her options with her.  She continues to improve clinically,. She is meeting criteria for and desires to continue conservative management and would like to be discharged with oral pain medications.  Patient will have close follow-up with me in the office.  She will be discharged home in the care of her mother who will help her as she recovers.      Consults (From admission, onward)         Status Ordering Provider     Inpatient consult to Obstetrics / Gynecology  Once        Provider:  Nallely Villanueva MD    Acknowledged REBA NICOLE          Significant Diagnostic Studies: Labs:   CBC   Recent Labs   Lab 03/26/22  1540 03/26/22  1930 03/27/22  0437   WBC 5.23 6.60 4.94   HGB 11.0* 10.6* 10.3*   HCT 33.1* 31.8* 31.4*    150 151     Lab Results   Component Value Date    GROUPTRH O POS 03/26/2022         Pending Diagnostic Studies:     None        Final Active Diagnoses:    Diagnosis Date Noted POA    PRINCIPAL PROBLEM:  Ruptured ovarian cyst [N83.209] 03/27/2022 Unknown    Hemoperitoneum [K66.1] 03/27/2022 Unknown      Problems Resolved During this Admission:        Discharged Condition: good    Disposition: Home    Follow Up:   Follow-up Information     Nallely Villanueva MD, MD Follow up in 1 week(s).    Specialty: Obstetrics and Gynecology  Why: follow up  Contact information:  [facility] [facility] [id number]  [phone]                       Patient Instructions:      Diet Adult Regular     Pelvic Rest     Notify your health care provider if you experience any of the following:  temperature >100.4     Notify your health care provider if you experience any of the following:  persistent nausea and vomiting or diarrhea     Notify your health care provider if you experience any of the following:  severe uncontrolled pain     Notify your health care provider if you experience any of the following:  difficulty breathing or increased cough     Notify your health care provider if you experience any of the following:  severe persistent headache     Notify your health care provider if you experience any of the following:  worsening rash     Notify your health care provider if you experience any of the following:  persistent dizziness, light-headedness, or visual disturbances     Notify your health care provider if you experience any of the following:  increased confusion or  weakness     Activity as tolerated     Medications:  Reconciled Home Medications:      Medication List      START taking these medications    acetaminophen 500 MG tablet  Commonly known as: TYLENOL  Take 2 tablets (1,000 mg total) by mouth every 8 (eight) hours as needed for Pain.     ibuprofen 600 MG tablet  Commonly known as: ADVIL,MOTRIN  Take 1 tablet (600 mg total) by mouth every 6 (six) hours as needed for Pain.     oxyCODONE 5 MG immediate release tablet  Commonly known as: ROXICODONE  Take 1 tablet (5 mg total) by mouth every 4 (four) hours as needed for Pain.        STOP taking these medications    azithromycin 250 MG tablet  Commonly known as: Z-NAMITA            Nallely Villanueva MD, MD  Obstetrics & Gynecology  [facility]

## 2022-03-27 NOTE — DISCHARGE INSTRUCTIONS
Patient Instructions:       Diet Adult Regular      Pelvic Rest - no sex, tampons, douching, nothing in the vagina       Notify your health care provider if you experience any of the following:  temperature >100.4      Notify your health care provider if you experience any of the following:  persistent nausea and vomiting or diarrhea      Notify your health care provider if you experience any of the following:  severe uncontrolled pain      Notify your health care provider if you experience any of the following:  difficulty breathing or increased cough      Notify your health care provider if you experience any of the following:  severe persistent headache      Notify your health care provider if you experience any of the following:  worsening rash      Notify your health care provider if you experience any of the following:  persistent dizziness, light-headedness, or visual disturbances      Notify your health care provider if you experience any of the following:  increased confusion or weakness      Activity as tolerated

## 2022-03-27 NOTE — PLAN OF CARE
VSS. Pain controlled with oral pain medicine as needed. Patient is ambulating, voiding, and tolerating a regular diet. Discharge instructions given to patient, questions encouraged and answered.

## 2022-03-27 NOTE — PLAN OF CARE
V/s stable, pain controlled with prn pain medications, voids without complication, h/h trending, pt verbalized understanding to plan of care

## 2024-09-24 ENCOUNTER — OFFICE VISIT (OUTPATIENT)
Dept: URGENT CARE | Facility: CLINIC | Age: 29
End: 2024-09-24
Payer: MEDICAID

## 2024-09-24 VITALS
RESPIRATION RATE: 18 BRPM | HEART RATE: 84 BPM | WEIGHT: 135 LBS | HEIGHT: 68 IN | DIASTOLIC BLOOD PRESSURE: 57 MMHG | TEMPERATURE: 99 F | BODY MASS INDEX: 20.46 KG/M2 | SYSTOLIC BLOOD PRESSURE: 104 MMHG | OXYGEN SATURATION: 97 %

## 2024-09-24 DIAGNOSIS — H60.91 OTITIS EXTERNA OF RIGHT EAR, UNSPECIFIED CHRONICITY, UNSPECIFIED TYPE: ICD-10-CM

## 2024-09-24 DIAGNOSIS — H66.91 RIGHT OTITIS MEDIA, UNSPECIFIED OTITIS MEDIA TYPE: Primary | ICD-10-CM

## 2024-09-24 PROCEDURE — 99214 OFFICE O/P EST MOD 30 MIN: CPT | Mod: S$GLB,,, | Performed by: STUDENT IN AN ORGANIZED HEALTH CARE EDUCATION/TRAINING PROGRAM

## 2024-09-24 RX ORDER — OFLOXACIN 3 MG/ML
5 SOLUTION AURICULAR (OTIC) DAILY
Qty: 5 ML | Refills: 0 | Status: SHIPPED | OUTPATIENT
Start: 2024-09-24 | End: 2024-10-01

## 2024-09-24 RX ORDER — AMOXICILLIN 500 MG/1
500 TABLET, FILM COATED ORAL EVERY 12 HOURS
Qty: 14 TABLET | Refills: 0 | Status: SHIPPED | OUTPATIENT
Start: 2024-09-24 | End: 2024-10-01

## 2024-09-24 NOTE — PROGRESS NOTES
"Subjective:      Patient ID: Nola Reyna is a 29 y.o. female.    Vitals:  height is 5' 8" (1.727 m) and weight is 61.2 kg (135 lb). Her temperature is 99 °F (37.2 °C). Her blood pressure is 104/57 (abnormal) and her pulse is 84. Her respiration is 18 and oxygen saturation is 97%.     Chief Complaint: Otalgia    Ambulatory to room with complaint of right ear pain with some discharge.  Symptoms present for 2-3 days.    Otalgia   There is pain in the right ear. This is a new problem. The current episode started in the past 7 days (x's 3 days). The problem has been gradually worsening. Associated symptoms include ear discharge. Treatments tried: peroxide. The treatment provided no relief.       Constitution: Negative for fever.   HENT:  Positive for ear pain and ear discharge.       Objective:     Physical Exam   Constitutional: She is oriented to person, place, and time. She appears well-developed. She is cooperative.  Non-toxic appearance. She does not appear ill. No distress.   HENT:   Head: Normocephalic and atraumatic.   Ears:   Right Ear: Hearing and external ear normal.   Left Ear: Hearing, tympanic membrane, external ear and ear canal normal.      Comments: Right tympanic membrane erythematous and edematous, ear canal is also erythematous and edematous  Nose: Nose normal. No mucosal edema, rhinorrhea or nasal deformity. No epistaxis. Right sinus exhibits no maxillary sinus tenderness and no frontal sinus tenderness. Left sinus exhibits no maxillary sinus tenderness and no frontal sinus tenderness.   Mouth/Throat: Uvula is midline, oropharynx is clear and moist and mucous membranes are normal. No trismus in the jaw. Normal dentition. No uvula swelling. No oropharyngeal exudate, posterior oropharyngeal edema or posterior oropharyngeal erythema.   Eyes: Conjunctivae and lids are normal. No scleral icterus.   Neck: Trachea normal and phonation normal. Neck supple. No edema present. No erythema present. No neck " rigidity present.   Cardiovascular: Normal rate, regular rhythm, normal heart sounds and normal pulses.   Pulmonary/Chest: Effort normal and breath sounds normal. No respiratory distress. She has no decreased breath sounds. She has no rhonchi.   Abdominal: Normal appearance.   Musculoskeletal: Normal range of motion.         General: No deformity. Normal range of motion.   Neurological: She is alert and oriented to person, place, and time. She exhibits normal muscle tone. Coordination normal.   Skin: Skin is warm, dry, intact, not diaphoretic and not pale.   Psychiatric: Her speech is normal and behavior is normal. Judgment and thought content normal.   Nursing note and vitals reviewed.      Assessment:     1. Right otitis media, unspecified otitis media type    2. Otitis externa of right ear, unspecified chronicity, unspecified type        Plan:       Right otitis media, unspecified otitis media type    Otitis externa of right ear, unspecified chronicity, unspecified type    Other orders  -     ofloxacin (FLOXIN) 0.3 % otic solution; Place 5 drops into the left ear once daily. for 7 days  Dispense: 5 mL; Refill: 0  -     amoxicillin (AMOXIL) 500 MG Tab; Take 1 tablet (500 mg total) by mouth every 12 (twelve) hours. for 7 days  Dispense: 14 tablet; Refill: 0           Take antibiotic as directed, may take yogurt daily or probiotic to decrease GI upset.  Patient educated that tympanic effusions are common following otitis media treatment.  Instructions given for when to present to ED.  - To ED for any new or acutely worsening symptoms including but not limited to chest pain, palpitations, shortness of breath, or fever greater than 103° F.  Patient in agreement with plan of care.    - The diagnosis, treatment plan, instructions for follow-up and reevaluation as well as ED precautions were discussed and understanding was verbalized. All questions or concerns have been addressed.  -Follow up with your primary care  provider for continued evaluation and management.